# Patient Record
(demographics unavailable — no encounter records)

---

## 2024-11-11 NOTE — ASSESSMENT
[FreeTextEntry1] : 9-year-old female with bilateral ATFL ankle injuries, now resolved.   Clinically patient is much improved.  She has no pain and is ambulating without any limping.  She is allowed to weight-bear as tolerated in regular shoes.  She can take Tylenol or Motrin as needed.  She is cleared to return to her activities as tolerated.  A school note was provided.  Follow-up as needed.  Today's visit included obtaining the history from the child and parent, due to the child's age, the child could not be considered a reliable historian, requiring the parent to act as an independent historian. The condition, natural history, and prognosis were explained to the patient and family. The clinical findings and images were reviewed with the family. All questions answered. Family expressed understanding and agreement with the above.   I, Giselle Quiles PA-C, have acted as a scribe and documented the above information for Dr. Kendrick.

## 2024-11-11 NOTE — HISTORY OF PRESENT ILLNESS
[FreeTextEntry1] : Татьяна is a 9-year-old female with bilateral ankle sprains.  Mother reports approximately 1 year ago from playing soccer she sustained 2 right ankle sprains, a few weeks apart.  She was initially seen at urgent care where x-rays were performed and reported to be unremarkable.  She was instructed to follow-up with orthopedics at that time, however her symptoms resolved.  However mother reports over the past few months she has been complaining of right ankle pain during activities, including soccer and gymnastics.  Her pain is localized to the lateral aspect of the ankle and does not radiate.  Mother denies noticing any recent right ankle swelling.  She also reports she was at gymnastics approximately 3 weeks ago when she had an inversion injury to her left foot.  Since that time she has been complaining of generalized left foot pain.  She reports her pain is worse when she is walking for long distances and participating in sports.  No reported numbness or tingling of the lower extremities.  Of note she does have appointment to see rheumatology in the upcoming weeks.  XRs at visit on 9/18/24 were negative without signs of obvious fracture, exam consistent with bilateral ankle sprains.  We have recommended physical therapy and rest from activities. Since injury, pain has improved. No tylenol/motrin needed. No numbness/tingling. No recent illnesses/fevers.  The patient's HPI was reviewed thoroughly with patient and parent. The patient's parent has acted as an independent historian regarding the above information due to the unreliable nature of the history obtained from the patient.

## 2024-11-11 NOTE — PHYSICAL EXAM
[FreeTextEntry1] :   GENERAL: alert, cooperative, in NAD SKIN: The skin is intact, warm, pink and dry over the area examined. EYES: Normal conjunctiva, normal eyelids and pupils were equal and round. ENT: normal ears, normal nose and normal lips. CARDIOVASCULAR: brisk capillary refill, but no peripheral edema. RESPIRATORY: The patient is in no apparent respiratory distress. They're taking full deep breaths without use of accessory muscles or evidence of audible wheezes or stridor without the use of a stethoscope. Normal respiratory effort.   Right/ Left Ankle Skin is warm and intact. No bony deformities, edema, ecchymosis, or erythema noted over the ankle. No tenderness with palpation over the lateral or medial malleolus. There is no tenderness over the anterior aspect of the ankle, anterior and posterior tibiofibular ligament, or deltoid ligament Full active and passive range of motion. Toes are warm, pink, and moving freely. Brisk capillary refill in all toes. Muscle strength is 5/5. Good flexibility of the Achilles tendon with knee in flexion and extension. Negative anterior drawer sign. The joint is stable with stress maneuver, no ligamentous laxity. Able to ambulate without assistance. No signs of antalgic gait.

## 2024-11-11 NOTE — DATA REVIEWED
[de-identified] : no new XRs today.   XRS, 3 views of the right ankle performed and reviewed in office today with no evidence of fracture or osseous abnormality. Ankle mortise is intact   XRs, 3 views of the left foot performed and reviewed in office today with no evidence of fracture, healing fracture, or osseous abnormality

## 2024-11-11 NOTE — END OF VISIT
[FreeTextEntry3] :     Saw and examined patient; the above is an accurate documentation of my words and actions.   Flora Kendrick MD [facility] Pediatric Orthopedic Surgery

## 2024-11-11 NOTE — REASON FOR VISIT
[Follow Up] : a follow up visit [Patient] : patient [Mother] : mother [FreeTextEntry1] : Bilateral ankle sprains

## 2024-11-11 NOTE — DATA REVIEWED
[de-identified] : no new XRs today.   XRS, 3 views of the right ankle performed and reviewed in office today with no evidence of fracture or osseous abnormality. Ankle mortise is intact   XRs, 3 views of the left foot performed and reviewed in office today with no evidence of fracture, healing fracture, or osseous abnormality

## 2024-11-11 NOTE — END OF VISIT
[FreeTextEntry3] :     Saw and examined patient; the above is an accurate documentation of my words and actions.   Flora Kendrick MD [facility] Pediatric Orthopedic Surgery